# Patient Record
Sex: MALE | Race: WHITE | NOT HISPANIC OR LATINO | URBAN - METROPOLITAN AREA
[De-identification: names, ages, dates, MRNs, and addresses within clinical notes are randomized per-mention and may not be internally consistent; named-entity substitution may affect disease eponyms.]

---

## 2017-03-28 ENCOUNTER — ON CAMPUS - OUTPATIENT (AMBULATORY)
Dept: URBAN - METROPOLITAN AREA HOSPITAL 77 | Facility: HOSPITAL | Age: 67
End: 2017-03-28
Payer: MEDICARE

## 2017-03-28 DIAGNOSIS — K57.90 DIVERTICULOSIS OF INTESTINE, PART UNSPECIFIED, WITHOUT PERFO: ICD-10-CM

## 2017-03-28 DIAGNOSIS — Z12.11 ENCOUNTER FOR SCREENING FOR MALIGNANT NEOPLASM OF COLON: ICD-10-CM

## 2017-03-28 PROCEDURE — G0121 COLON CA SCRN NOT HI RSK IND: HCPCS | Performed by: INTERNAL MEDICINE

## 2023-04-04 ENCOUNTER — TRANSCRIBE ORDERS (OUTPATIENT)
Dept: ADMINISTRATIVE | Facility: HOSPITAL | Age: 73
End: 2023-04-04
Payer: MEDICARE

## 2023-04-12 ENCOUNTER — TRANSCRIBE ORDERS (OUTPATIENT)
Dept: ADMINISTRATIVE | Facility: HOSPITAL | Age: 73
End: 2023-04-12
Payer: MEDICARE

## 2023-04-12 DIAGNOSIS — R07.9 CHEST PAIN, UNSPECIFIED TYPE: Primary | ICD-10-CM

## 2023-04-20 ENCOUNTER — HOSPITAL ENCOUNTER (OUTPATIENT)
Dept: NUCLEAR MEDICINE | Facility: HOSPITAL | Age: 73
Discharge: HOME | End: 2023-04-20
Payer: MEDICARE

## 2023-04-20 DIAGNOSIS — R07.9 CHEST PAIN, UNSPECIFIED TYPE: ICD-10-CM

## 2023-04-20 LAB
BH CV NUCLEAR PRIOR STUDY: 2
BH CV REST NUCLEAR ISOTOPE DOSE: 11 MCI
BH CV STRESS BP STAGE 1: NORMAL
BH CV STRESS BP STAGE 2: NORMAL
BH CV STRESS DURATION MIN STAGE 1: 3
BH CV STRESS DURATION MIN STAGE 2: 3
BH CV STRESS DURATION SEC STAGE 1: 0
BH CV STRESS DURATION SEC STAGE 2: 58
BH CV STRESS GRADE STAGE 1: 10
BH CV STRESS GRADE STAGE 2: 12
BH CV STRESS HR STAGE 2: 132
BH CV STRESS METS STAGE 1: 5
BH CV STRESS METS STAGE 2: 7.5
BH CV STRESS NUCLEAR ISOTOPE DOSE: 33 MCI
BH CV STRESS PROTOCOL 1: NORMAL
BH CV STRESS RECOVERY BP: NORMAL MMHG
BH CV STRESS RECOVERY HR: 70 BPM
BH CV STRESS SPEED STAGE 1: 1.7
BH CV STRESS SPEED STAGE 2: 2.5
BH CV STRESS STAGE 1: 1
BH CV STRESS STAGE 2: 2
LV EF NUC BP: 73 %
MAXIMAL PREDICTED HEART RATE: 149 BPM
PERCENT MAX PREDICTED HR: 88.59 %
STRESS BASELINE BP: NORMAL MMHG
STRESS BASELINE HR: 73 BPM
STRESS PERCENT HR: 104 %
STRESS POST ESTIMATED WORKLOAD: 7 METS
STRESS POST EXERCISE DUR MIN: 6 MIN
STRESS POST EXERCISE DUR SEC: 58 SEC
STRESS POST PEAK BP: NORMAL MMHG
STRESS POST PEAK HR: 132 BPM
STRESS TARGET HR: 127 BPM

## 2023-04-20 PROCEDURE — A9502 TC99M TETROFOSMIN: HCPCS | Performed by: NURSE PRACTITIONER

## 2023-04-20 PROCEDURE — 78452 HT MUSCLE IMAGE SPECT MULT: CPT

## 2023-04-20 PROCEDURE — 0 TECHNETIUM TETROFOSMIN KIT: Performed by: NURSE PRACTITIONER

## 2023-04-20 PROCEDURE — 93018 CV STRESS TEST I&R ONLY: CPT | Performed by: INTERNAL MEDICINE

## 2023-04-20 PROCEDURE — 93016 CV STRESS TEST SUPVJ ONLY: CPT | Performed by: INTERNAL MEDICINE

## 2023-04-20 PROCEDURE — 93017 CV STRESS TEST TRACING ONLY: CPT

## 2023-04-20 PROCEDURE — 78452 HT MUSCLE IMAGE SPECT MULT: CPT | Performed by: INTERNAL MEDICINE

## 2023-04-20 RX ADMIN — TETROFOSMIN 1 DOSE: 1.38 INJECTION, POWDER, LYOPHILIZED, FOR SOLUTION INTRAVENOUS at 09:53

## 2023-04-20 RX ADMIN — TETROFOSMIN 1 DOSE: 1.38 INJECTION, POWDER, LYOPHILIZED, FOR SOLUTION INTRAVENOUS at 10:51

## 2023-05-09 NOTE — PROGRESS NOTES
Chief Complaint  NEW PATIENT (UMA/)    Subjective          Seb Johnson presents to [facility] NEUROLOGY  History of Present Illness  New UMA patient  currenly on cpap machine prev. Sleep dr moved away, patient states he is benefiting from pap therapy he uses [facility] and goes through [facility] for supplies. Patient states that sleep study was ordered urologist    Sleep testing history:    On NPSG at HOME SLEEP STUDY by [facility] , 2/22/21 patient had Moderate obstructive sleep apnea     PAP download:  The patient is on CPAP therapy at 5-15 cm/H2O.   Data indicates Excellent compliance. With 100% usage for more than 4 hours with an average usage of 8 hours 17 minutes. AHI down to 3.1 .  Average pressures 8.8.  Average large leak 15sec.     The patient's hypersomnia has resolved       [place] Sleepiness Scale:  Sitting and reading 1 WatchingTV 2  Sitting, inactive, in a public place 1  As a passenger in a car for 1 hour w/o a break  1  Lying down to rest in the afternoon  2  Sitting and talking to someone  0  Sitting quietly after a lunch  1  In a car, while stopped for traffic or a light  0  Total 8    Review of Systems   Eyes: Positive for itching.   Respiratory: Positive for apnea and chest tightness.    Genitourinary: Positive for frequency.   Musculoskeletal: Positive for neck pain and neck stiffness.   All other systems reviewed and are negative.        Objective   Vital Signs:   /82   Pulse 58   Resp 18   Wt 123 kg (272 lb)     Physical Exam  HENT:      Nose: Nose normal.   Cardiovascular:      Pulses: Normal pulses.   Pulmonary:      Effort: Pulmonary effort is normal. No respiratory distress.   Neurological:      General: No focal deficit present.      Mental Status: He is alert and oriented to person, place, and time.   Psychiatric:         Mood and Affect: Mood normal.        Result Review :                 Assessment and Plan    Diagnoses and all orders for this visit:    1. UMA  (obstructive sleep apnea) (Primary)  -     PAP Therapy      continue CPAP, will increase the pressure to min 8 max 15   The patient is compliant with and benefiting from PAP therapy.      Follow Up   Return in about 1 year (around 5/10/2024).    Patient was given instructions and counseling regarding his condition or for health maintenance advice. Please see specific information pulled into the AVS if appropriate.       This document has been electronically signed by Joseph Seipel, MD on May 10, 2023 12:15 EDT

## 2023-05-10 ENCOUNTER — OFFICE VISIT (OUTPATIENT)
Dept: NEUROLOGY | Facility: CLINIC | Age: 73
End: 2023-05-10
Payer: MEDICARE

## 2023-05-10 ENCOUNTER — TELEPHONE (OUTPATIENT)
Dept: NEUROLOGY | Facility: CLINIC | Age: 73
End: 2023-05-10

## 2023-05-10 VITALS
DIASTOLIC BLOOD PRESSURE: 82 MMHG | WEIGHT: 272 LBS | RESPIRATION RATE: 18 BRPM | SYSTOLIC BLOOD PRESSURE: 151 MMHG | HEART RATE: 58 BPM

## 2023-05-10 DIAGNOSIS — G47.33 OBSTRUCTIVE SLEEP APNEA: Primary | ICD-10-CM

## 2023-05-10 DIAGNOSIS — G47.33 OSA (OBSTRUCTIVE SLEEP APNEA): Primary | ICD-10-CM

## 2023-05-10 PROBLEM — M10.9 GOUT: Status: ACTIVE | Noted: 2022-03-01

## 2023-05-10 PROBLEM — I10 HTN, GOAL BELOW 140/90: Status: ACTIVE | Noted: 2022-03-01

## 2023-05-10 PROBLEM — K21.9 GASTROESOPHAGEAL REFLUX DISEASE: Status: ACTIVE | Noted: 2022-03-01

## 2023-05-10 PROBLEM — H40.013 OPEN ANGLE WITH BORDERLINE FINDINGS, LOW RISK, BILATERAL: Status: ACTIVE | Noted: 2020-06-23

## 2023-05-10 PROCEDURE — 3079F DIAST BP 80-89 MM HG: CPT | Performed by: PSYCHIATRY & NEUROLOGY

## 2023-05-10 PROCEDURE — 1160F RVW MEDS BY RX/DR IN RCRD: CPT | Performed by: PSYCHIATRY & NEUROLOGY

## 2023-05-10 PROCEDURE — 99203 OFFICE O/P NEW LOW 30 MIN: CPT | Performed by: PSYCHIATRY & NEUROLOGY

## 2023-05-10 PROCEDURE — 3077F SYST BP >= 140 MM HG: CPT | Performed by: PSYCHIATRY & NEUROLOGY

## 2023-05-10 PROCEDURE — 1159F MED LIST DOCD IN RCRD: CPT | Performed by: PSYCHIATRY & NEUROLOGY

## 2023-05-10 RX ORDER — DICLOFENAC SODIUM 75 MG/1
TABLET, DELAYED RELEASE ORAL
COMMUNITY
Start: 2023-03-04

## 2023-05-10 RX ORDER — ASPIRIN 81 MG/1
81 TABLET ORAL DAILY
COMMUNITY

## 2023-05-10 RX ORDER — ALLOPURINOL 300 MG/1
1 TABLET ORAL DAILY
COMMUNITY
Start: 2023-04-17

## 2023-05-10 RX ORDER — PAROXETINE HYDROCHLORIDE 20 MG/1
TABLET, FILM COATED ORAL
COMMUNITY
Start: 2023-02-02

## 2023-05-10 RX ORDER — LISINOPRIL AND HYDROCHLOROTHIAZIDE 20; 12.5 MG/1; MG/1
TABLET ORAL
COMMUNITY
Start: 2023-03-31

## 2023-05-10 RX ORDER — CETIRIZINE HYDROCHLORIDE 10 MG/1
CAPSULE, LIQUID FILLED ORAL
COMMUNITY

## 2023-05-10 RX ORDER — OMEPRAZOLE 40 MG/1
40 CAPSULE, DELAYED RELEASE ORAL DAILY
Qty: 30 CAPSULE | Refills: 2 | COMMUNITY
Start: 2023-04-05 | End: 2023-07-04

## 2024-01-31 ENCOUNTER — HOSPITAL ENCOUNTER (OUTPATIENT)
Dept: GENERAL RADIOLOGY | Facility: HOSPITAL | Age: 74
Discharge: HOME | End: 2024-01-31
Payer: MEDICARE

## 2024-01-31 ENCOUNTER — TRANSCRIBE ORDERS (OUTPATIENT)
Dept: ADMINISTRATIVE | Facility: HOSPITAL | Age: 74
End: 2024-01-31
Payer: MEDICARE

## 2024-01-31 DIAGNOSIS — M79.603 PAIN OF UPPER EXTREMITY, UNSPECIFIED LATERALITY: ICD-10-CM

## 2024-01-31 DIAGNOSIS — M79.603 PAIN OF UPPER EXTREMITY, UNSPECIFIED LATERALITY: Primary | ICD-10-CM

## 2024-01-31 PROCEDURE — 73110 X-RAY EXAM OF WRIST: CPT

## 2024-01-31 PROCEDURE — 73130 X-RAY EXAM OF HAND: CPT

## 2024-02-26 ENCOUNTER — TELEPHONE (OUTPATIENT)
Dept: NEUROLOGY | Facility: CLINIC | Age: 74
End: 2024-02-26
Payer: MEDICARE

## 2024-02-26 NOTE — TELEPHONE ENCOUNTER
I laura't know about this.  Clean with detergent and water and or diluted vinegar as recommended by the manufacture of the machine is the best

## 2024-02-26 NOTE — TELEPHONE ENCOUNTER
Provider: SEIPEL    Caller: PATIENT    Relationship to Patient: SELF    Phone Number: [phone]    Reason for Call: PT HAS A SLEEP8  MACHINE TO CLEAN THE ACCESSORIES FOR HIS CPAP MACHINE AND IT HAS BEEN DISCONTINUED.  PT WOULD LIKE A RECOMMENDATION ON A NEW MACHINE.  HE IS LOOKING AT [employer] AND WOULD LIKE TO KNOW IF THIS IS A GOOD CHOICE.     PLEASE CALL PT TO ADVISE     THANK YOU

## 2024-05-14 NOTE — PROGRESS NOTES
Sleep medicine follow-up visit    Seb Johnson   1950  73 y.o. male   DATE OF SERVICE: 5/15/2024     Patient is here for follow up on UMA, he sleeps with CPAP and he is benefiting from pap therapy he uses [facility] and goes through [facility] for supplies.     Sleep testing history:    On NPSG at HOME SLEEP STUDY by [facility] , 2/22/21 patient had Moderate obstructive sleep apnea     The compliance data reviewed and the patient is on CPAP therapy at 8-15 cm/H2O and compliance data indicates excellent compliance with 97.8% usage for more than 4 hours with an average usage of 7 hours 49 minutes. AHI down to 2.1 with CPAP therapy and mean CPAP pressure 9.6 cm of water.  The patient's hypersomnia also resolved with [place] Sleepiness Scale score of 7 with CPAP therapy.  The patient feels great and is benefiting from it and is compliant.       The patient states he is doing very well he is not sleepy and he is having no issues with his machine.  He reports he gets his equipment from [place] and is very pleased with the frequency that he gets his supplies.    [place] SLEEPINESS SCALE  Sitting and reading 2 WatchingTV 2  Sitting, inactive, in a public place 2  As a passenger in a car for 1 hour w/o a break  0  Lying down to rest in the afternoon  1  Sitting and talking to someone  0  Sitting quietly after a lunch  0  In a car, while stopped for traffic or a light  0  Total 7      Review of Systems   Respiratory:  Positive for apnea.    All other systems reviewed and are negative.    I reviewed and addressed ROS entered by MA.    History of Present Illness    The following portions of the patient's history were reviewed and updated as appropriate: allergies, current medications, past family history, past medical history, past social history, past surgical history and problem list.      History reviewed. No pertinent family history.    History reviewed. No pertinent past medical history.    Social History     Socioeconomic  History    Marital status: Unknown   Substance and Sexual Activity    Alcohol use: Defer    Drug use: Defer    Sexual activity: Defer         Current Outpatient Medications:     allopurinol (ZYLOPRIM) 300 MG tablet, Take 1 tablet by mouth Daily., Disp: , Rfl:     aspirin 81 MG EC tablet, Take 1 tablet by mouth Daily., Disp: , Rfl:     atorvastatin (LIPITOR) 20 MG tablet, Take 1 tablet by mouth Daily., Disp: , Rfl:     celecoxib (CeleBREX) 200 MG capsule, Take 1 capsule by mouth Daily., Disp: , Rfl:     Cetirizine HCl (ZyrTEC Allergy) 10 MG capsule, Take  by mouth., Disp: , Rfl:     diclofenac (VOLTAREN) 75 MG EC tablet, , Disp: , Rfl:     hydroCHLOROthiazide 25 MG tablet, Take 1 tablet by mouth Daily., Disp: , Rfl:     imiquimod (ALDARA) 5 % cream, APPLY TO THE RIGHT TEMPLE THREE TIMES A WEEK FOR TWO MONTHS. WASH OFF IN THE MORNING., Disp: , Rfl:     lisinopril (PRINIVIL,ZESTRIL) 40 MG tablet, Take 1 tablet by mouth Daily., Disp: , Rfl:     lisinopril-hydrochlorothiazide (PRINZIDE,ZESTORETIC) 20-12.5 MG per tablet, , Disp: , Rfl:     metaxalone (SKELAXIN) 800 MG tablet, , Disp: , Rfl:     omeprazole (priLOSEC) 40 MG capsule, Take 1 capsule by mouth Daily., Disp: , Rfl:     PARoxetine (PAXIL) 20 MG tablet, , Disp: , Rfl:     No Known Allergies     PHYSICAL EXAMINATION:  Vitals:    05/15/24 1642   BP: 180/96   Pulse: 80      Body mass index is 38.74 kg/m².       HEENT: Normal.    CARDIAC: Normal.   LUNGS: Clear to auscultation.   EXTREMITIES: No edema.     Diagnoses and all orders for this visit:    1. UMA (obstructive sleep apnea) (Primary)       The patient was cautioned that obstructive sleep disordered breathing might be aggravated by certain conditions such as traveling to high altitudes, post anesthetic states or respiratory allergies.  Medications such as sedatives, hypnotics, muscle relaxants, opiate analgesics and or alcohol can aggravate the underlying obstructive sleep disordered breathing.If the patient is  significantly drowsy or inattentive during the daytime, should be advised to avoid situations such as driving in which safety could be compromised by impairment of alertness     Return in about 1 year (around 5/15/2025), or Heidi Delgado.    I spent 10 minutes caring for Seb on this date of service. This time includes time spent by me in the following activities: preparing for the visit, reviewing tests, obtaining and/or reviewing a separately obtained history, performing a medically appropriate examination and/or evaluation, and documenting information in the medical record.      This document has been electronically signed by Heidi ROBERTS on May 15, 2024 16:59 EDT

## 2024-05-15 ENCOUNTER — OFFICE VISIT (OUTPATIENT)
Dept: NEUROLOGY | Facility: CLINIC | Age: 74
End: 2024-05-15
Payer: MEDICARE

## 2024-05-15 VITALS
SYSTOLIC BLOOD PRESSURE: 180 MMHG | HEART RATE: 80 BPM | DIASTOLIC BLOOD PRESSURE: 96 MMHG | HEIGHT: 70 IN | WEIGHT: 270 LBS | BODY MASS INDEX: 38.65 KG/M2

## 2024-05-15 DIAGNOSIS — G47.33 OSA (OBSTRUCTIVE SLEEP APNEA): Primary | ICD-10-CM

## 2024-05-15 PROCEDURE — 3077F SYST BP >= 140 MM HG: CPT | Performed by: NURSE PRACTITIONER

## 2024-05-15 PROCEDURE — 1160F RVW MEDS BY RX/DR IN RCRD: CPT | Performed by: NURSE PRACTITIONER

## 2024-05-15 PROCEDURE — 1159F MED LIST DOCD IN RCRD: CPT | Performed by: NURSE PRACTITIONER

## 2024-05-15 PROCEDURE — 3080F DIAST BP >= 90 MM HG: CPT | Performed by: NURSE PRACTITIONER

## 2024-05-15 PROCEDURE — 99212 OFFICE O/P EST SF 10 MIN: CPT | Performed by: NURSE PRACTITIONER

## 2024-05-15 RX ORDER — ATORVASTATIN CALCIUM 20 MG/1
20 TABLET, FILM COATED ORAL DAILY
COMMUNITY
Start: 2023-12-04 | End: 2024-12-03

## 2024-05-15 RX ORDER — IMIQUIMOD 12.5 MG/.25G
CREAM TOPICAL
COMMUNITY
Start: 2024-02-29

## 2024-05-15 RX ORDER — METAXALONE 800 MG/1
TABLET ORAL
COMMUNITY

## 2024-05-15 RX ORDER — HYDROCHLOROTHIAZIDE 25 MG/1
1 TABLET ORAL DAILY
COMMUNITY
Start: 2024-03-18

## 2024-05-15 RX ORDER — CELECOXIB 200 MG/1
200 CAPSULE ORAL DAILY
COMMUNITY
Start: 2023-12-04 | End: 2024-12-03

## 2024-05-15 RX ORDER — LISINOPRIL 40 MG/1
40 TABLET ORAL DAILY
COMMUNITY
Start: 2023-12-11

## 2024-05-15 RX ORDER — OMEPRAZOLE 40 MG/1
40 CAPSULE, DELAYED RELEASE ORAL DAILY
COMMUNITY
Start: 2023-11-24

## 2025-04-25 ENCOUNTER — TELEPHONE (OUTPATIENT)
Dept: NEUROLOGY | Facility: CLINIC | Age: 75
End: 2025-04-25
Payer: MEDICARE

## 2025-04-25 NOTE — TELEPHONE ENCOUNTER
Caller: ISATU    Relationship: [employer] ([employer] [facility])    Best call back number: [phone]    What was the call regarding: [facility] STATES THEY RECEIVED BACK THE PRESCRIPTION REQUEST FROM THE OFFICE, HOWEVER, THE CHANGES THAT WERE MADE ON THE PRESCRIPTION WERE NOT INITIALED AND DATED, THEREFORE, PT'S INSURANCE WILL NOT ACCEPT/COVER PT'S CPAP SUPPLIES.    [facility] WILL BE RE-FAXING THE PRESCRIPTION REQUEST TO THE OFFICE TO BE RE-COMPLETED.    Do you require a callback: IF NEEDED.    PLEASE REVIEW AND ADVISE.

## 2025-05-21 ENCOUNTER — OFFICE VISIT (OUTPATIENT)
Dept: NEUROLOGY | Facility: CLINIC | Age: 75
End: 2025-05-21
Payer: MEDICARE

## 2025-05-21 VITALS
DIASTOLIC BLOOD PRESSURE: 82 MMHG | BODY MASS INDEX: 38.94 KG/M2 | WEIGHT: 272 LBS | HEART RATE: 73 BPM | SYSTOLIC BLOOD PRESSURE: 157 MMHG | HEIGHT: 70 IN

## 2025-05-21 DIAGNOSIS — G47.33 OSA (OBSTRUCTIVE SLEEP APNEA): Primary | ICD-10-CM

## 2025-05-21 RX ORDER — CELECOXIB 200 MG/1
CAPSULE ORAL
COMMUNITY